# Patient Record
Sex: MALE | Race: BLACK OR AFRICAN AMERICAN | NOT HISPANIC OR LATINO | Employment: FULL TIME | URBAN - METROPOLITAN AREA
[De-identification: names, ages, dates, MRNs, and addresses within clinical notes are randomized per-mention and may not be internally consistent; named-entity substitution may affect disease eponyms.]

---

## 2023-01-01 ENCOUNTER — CLINICAL SUPPORT (OUTPATIENT)
Dept: PRIMARY CARE | Facility: CLINIC | Age: 63
End: 2023-01-01
Payer: COMMERCIAL

## 2023-01-01 ENCOUNTER — OFFICE VISIT (OUTPATIENT)
Dept: PRIMARY CARE | Facility: CLINIC | Age: 63
End: 2023-01-01
Payer: COMMERCIAL

## 2023-01-01 VITALS
WEIGHT: 264 LBS | SYSTOLIC BLOOD PRESSURE: 112 MMHG | BODY MASS INDEX: 36.96 KG/M2 | DIASTOLIC BLOOD PRESSURE: 70 MMHG | RESPIRATION RATE: 16 BRPM | HEIGHT: 71 IN | HEART RATE: 85 BPM | OXYGEN SATURATION: 98 % | TEMPERATURE: 97.7 F

## 2023-01-01 DIAGNOSIS — E66.01 MORBID (SEVERE) OBESITY DUE TO EXCESS CALORIES (MULTI): ICD-10-CM

## 2023-01-01 DIAGNOSIS — E78.2 MIXED HYPERLIPIDEMIA: ICD-10-CM

## 2023-01-01 DIAGNOSIS — I10 ESSENTIAL HYPERTENSION: ICD-10-CM

## 2023-01-01 DIAGNOSIS — E11.65 TYPE 2 DIABETES MELLITUS WITH HYPERGLYCEMIA, WITHOUT LONG-TERM CURRENT USE OF INSULIN (MULTI): Primary | ICD-10-CM

## 2023-01-01 DIAGNOSIS — Z23 NEED FOR INFLUENZA VACCINATION: ICD-10-CM

## 2023-01-01 LAB
ALANINE AMINOTRANSFERASE (SGPT) (U/L) IN SER/PLAS: 19 U/L (ref 10–52)
ALBUMIN (G/DL) IN SER/PLAS: 4.5 G/DL (ref 3.4–5)
ALBUMIN (MG/L) IN URINE: <7 MG/L
ALBUMIN/CREATININE (UG/MG) IN URINE: NORMAL UG/MG CRT (ref 0–30)
ALKALINE PHOSPHATASE (U/L) IN SER/PLAS: 40 U/L (ref 33–136)
ANION GAP IN SER/PLAS: 15 MMOL/L (ref 10–20)
APPEARANCE, URINE: CLEAR
ASPARTATE AMINOTRANSFERASE (SGOT) (U/L) IN SER/PLAS: 21 U/L (ref 9–39)
BASOPHILS (10*3/UL) IN BLOOD BY AUTOMATED COUNT: 0.06 X10E9/L (ref 0–0.1)
BASOPHILS/100 LEUKOCYTES IN BLOOD BY AUTOMATED COUNT: 0.7 % (ref 0–2)
BILIRUBIN TOTAL (MG/DL) IN SER/PLAS: 0.6 MG/DL (ref 0–1.2)
BILIRUBIN, URINE: NEGATIVE
BLOOD, URINE: NEGATIVE
CALCIUM (MG/DL) IN SER/PLAS: 9.9 MG/DL (ref 8.6–10.3)
CARBON DIOXIDE, TOTAL (MMOL/L) IN SER/PLAS: 25 MMOL/L (ref 21–32)
CHLORIDE (MMOL/L) IN SER/PLAS: 106 MMOL/L (ref 98–107)
CHOLESTEROL (MG/DL) IN SER/PLAS: 141 MG/DL (ref 0–199)
CHOLESTEROL IN HDL (MG/DL) IN SER/PLAS: 41.2 MG/DL
CHOLESTEROL/HDL RATIO: 3.4
COLOR, URINE: YELLOW
CREATININE (MG/DL) IN SER/PLAS: 1.2 MG/DL (ref 0.5–1.3)
CREATININE (MG/DL) IN URINE: 141 MG/DL (ref 20–370)
EOSINOPHILS (10*3/UL) IN BLOOD BY AUTOMATED COUNT: 0.16 X10E9/L (ref 0–0.7)
EOSINOPHILS/100 LEUKOCYTES IN BLOOD BY AUTOMATED COUNT: 1.8 % (ref 0–6)
ERYTHROCYTE DISTRIBUTION WIDTH (RATIO) BY AUTOMATED COUNT: 12.3 % (ref 11.5–14.5)
ERYTHROCYTE MEAN CORPUSCULAR HEMOGLOBIN CONCENTRATION (G/DL) BY AUTOMATED: 33.7 G/DL (ref 32–36)
ERYTHROCYTE MEAN CORPUSCULAR VOLUME (FL) BY AUTOMATED COUNT: 88 FL (ref 80–100)
ERYTHROCYTES (10*6/UL) IN BLOOD BY AUTOMATED COUNT: 4.87 X10E12/L (ref 4.5–5.9)
ESTIMATED AVERAGE GLUCOSE FOR HBA1C: 169 MG/DL
GFR MALE: 69 ML/MIN/1.73M2
GLUCOSE (MG/DL) IN SER/PLAS: 126 MG/DL (ref 74–99)
GLUCOSE, URINE: NEGATIVE MG/DL
HEMATOCRIT (%) IN BLOOD BY AUTOMATED COUNT: 43 % (ref 41–52)
HEMOGLOBIN (G/DL) IN BLOOD: 14.5 G/DL (ref 13.5–17.5)
HEMOGLOBIN A1C/HEMOGLOBIN TOTAL IN BLOOD: 7.5 %
IMMATURE GRANULOCYTES/100 LEUKOCYTES IN BLOOD BY AUTOMATED COUNT: 0.2 % (ref 0–0.9)
KETONES, URINE: NEGATIVE MG/DL
LDL: 60 MG/DL (ref 0–99)
LEUKOCYTE ESTERASE, URINE: NEGATIVE
LEUKOCYTES (10*3/UL) IN BLOOD BY AUTOMATED COUNT: 8.7 X10E9/L (ref 4.4–11.3)
LYMPHOCYTES (10*3/UL) IN BLOOD BY AUTOMATED COUNT: 2.2 X10E9/L (ref 1.2–4.8)
LYMPHOCYTES/100 LEUKOCYTES IN BLOOD BY AUTOMATED COUNT: 25.4 % (ref 13–44)
MONOCYTES (10*3/UL) IN BLOOD BY AUTOMATED COUNT: 0.81 X10E9/L (ref 0.1–1)
MONOCYTES/100 LEUKOCYTES IN BLOOD BY AUTOMATED COUNT: 9.4 % (ref 2–10)
NEUTROPHILS (10*3/UL) IN BLOOD BY AUTOMATED COUNT: 5.4 X10E9/L (ref 1.2–7.7)
NEUTROPHILS/100 LEUKOCYTES IN BLOOD BY AUTOMATED COUNT: 62.5 % (ref 40–80)
NITRITE, URINE: NEGATIVE
PH, URINE: 5 (ref 5–8)
PLATELETS (10*3/UL) IN BLOOD AUTOMATED COUNT: 325 X10E9/L (ref 150–450)
POC FINGERSTICK BLOOD GLUCOSE: 99 MG/DL (ref 70–100)
POC HEMOGLOBIN A1C: 6.7 % (ref 4.2–6.5)
POTASSIUM (MMOL/L) IN SER/PLAS: 4.1 MMOL/L (ref 3.5–5.3)
PROSTATE SPECIFIC AG (NG/ML) IN SER/PLAS: 27.38 NG/ML (ref 0–4)
PROTEIN TOTAL: 7.1 G/DL (ref 6.4–8.2)
PROTEIN, URINE: NEGATIVE MG/DL
SODIUM (MMOL/L) IN SER/PLAS: 142 MMOL/L (ref 136–145)
SPECIFIC GRAVITY, URINE: 1.02 (ref 1–1.03)
THYROTROPIN (MIU/L) IN SER/PLAS BY DETECTION LIMIT <= 0.05 MIU/L: 0.76 MIU/L (ref 0.44–3.98)
TRIGLYCERIDE (MG/DL) IN SER/PLAS: 197 MG/DL (ref 0–149)
UREA NITROGEN (MG/DL) IN SER/PLAS: 16 MG/DL (ref 6–23)
UROBILINOGEN, URINE: <2 MG/DL (ref 0–1.9)
VLDL: 39 MG/DL (ref 0–40)

## 2023-01-01 PROCEDURE — 3074F SYST BP LT 130 MM HG: CPT | Performed by: INTERNAL MEDICINE

## 2023-01-01 PROCEDURE — 90686 IIV4 VACC NO PRSV 0.5 ML IM: CPT | Performed by: INTERNAL MEDICINE

## 2023-01-01 PROCEDURE — 82962 GLUCOSE BLOOD TEST: CPT | Performed by: INTERNAL MEDICINE

## 2023-01-01 PROCEDURE — 3078F DIAST BP <80 MM HG: CPT | Performed by: INTERNAL MEDICINE

## 2023-01-01 PROCEDURE — 3051F HG A1C>EQUAL 7.0%<8.0%: CPT | Performed by: INTERNAL MEDICINE

## 2023-01-01 PROCEDURE — 83036 HEMOGLOBIN GLYCOSYLATED A1C: CPT | Performed by: INTERNAL MEDICINE

## 2023-01-01 PROCEDURE — 99213 OFFICE O/P EST LOW 20 MIN: CPT | Performed by: INTERNAL MEDICINE

## 2023-01-01 PROCEDURE — 3008F BODY MASS INDEX DOCD: CPT | Performed by: INTERNAL MEDICINE

## 2023-01-01 PROCEDURE — 90471 IMMUNIZATION ADMIN: CPT | Performed by: INTERNAL MEDICINE

## 2023-01-01 RX ORDER — LISINOPRIL 10 MG/1
10 TABLET ORAL DAILY
COMMUNITY
End: 2023-01-01 | Stop reason: WASHOUT

## 2023-01-01 RX ORDER — ASPIRIN 81 MG/1
81 TABLET ORAL DAILY
COMMUNITY

## 2023-01-01 RX ORDER — METFORMIN HYDROCHLORIDE 1000 MG/1
1000 TABLET ORAL 2 TIMES DAILY
COMMUNITY
End: 2023-01-01 | Stop reason: SDUPTHER

## 2023-01-01 RX ORDER — AMLODIPINE BESYLATE 5 MG/1
TABLET ORAL
COMMUNITY
End: 2023-01-01 | Stop reason: SDUPTHER

## 2023-01-01 RX ORDER — PRAVASTATIN SODIUM 80 MG/1
80 TABLET ORAL DAILY
COMMUNITY
End: 2023-01-01 | Stop reason: SDUPTHER

## 2023-01-01 RX ORDER — LISINOPRIL AND HYDROCHLOROTHIAZIDE 20; 25 MG/1; MG/1
1 TABLET ORAL DAILY
Qty: 100 TABLET | Refills: 1 | Status: SHIPPED | OUTPATIENT
Start: 2023-01-01 | End: 2024-05-02

## 2023-01-01 RX ORDER — METFORMIN HYDROCHLORIDE 1000 MG/1
1000 TABLET ORAL 2 TIMES DAILY
Qty: 200 TABLET | Refills: 1 | Status: SHIPPED | OUTPATIENT
Start: 2023-01-01 | End: 2024-05-02

## 2023-01-01 RX ORDER — BLOOD-GLUCOSE METER
EACH MISCELLANEOUS 2 TIMES DAILY
COMMUNITY
Start: 2021-12-29 | End: 2023-01-01 | Stop reason: SDUPTHER

## 2023-01-01 RX ORDER — DEXTROSE 4 G
TABLET,CHEWABLE ORAL
Refills: 0 | Status: CANCELLED | OUTPATIENT
Start: 2023-01-01

## 2023-01-01 RX ORDER — AMLODIPINE BESYLATE 5 MG/1
5 TABLET ORAL DAILY
Qty: 100 TABLET | Refills: 1 | Status: SHIPPED | OUTPATIENT
Start: 2023-01-01 | End: 2024-05-02

## 2023-01-01 RX ORDER — LISINOPRIL AND HYDROCHLOROTHIAZIDE 20; 25 MG/1; MG/1
1 TABLET ORAL DAILY
COMMUNITY
End: 2023-01-01 | Stop reason: SDUPTHER

## 2023-01-01 RX ORDER — PRAVASTATIN SODIUM 80 MG/1
80 TABLET ORAL DAILY
Qty: 100 TABLET | Refills: 1 | Status: SHIPPED | OUTPATIENT
Start: 2023-01-01 | End: 2024-05-02

## 2023-01-01 RX ORDER — GLIMEPIRIDE 4 MG/1
4 TABLET ORAL 2 TIMES DAILY
Qty: 200 TABLET | Refills: 1 | Status: SHIPPED | OUTPATIENT
Start: 2023-01-01 | End: 2024-05-02

## 2023-01-01 RX ORDER — DULAGLUTIDE 1.5 MG/.5ML
1.5 INJECTION, SOLUTION SUBCUTANEOUS
Qty: 8 ML | Refills: 1 | Status: SHIPPED | OUTPATIENT
Start: 2023-01-01 | End: 2024-01-01 | Stop reason: DRUGHIGH

## 2023-01-01 RX ORDER — DULAGLUTIDE 1.5 MG/.5ML
1.5 INJECTION, SOLUTION SUBCUTANEOUS
COMMUNITY
Start: 2022-01-12 | End: 2023-01-01 | Stop reason: SDUPTHER

## 2023-01-01 RX ORDER — BLOOD-GLUCOSE METER
1 EACH MISCELLANEOUS 2 TIMES DAILY
Qty: 100 STRIP | Refills: 1 | Status: SHIPPED | OUTPATIENT
Start: 2023-01-01 | End: 2024-05-02

## 2023-01-01 RX ORDER — GLIMEPIRIDE 4 MG/1
4 TABLET ORAL 2 TIMES DAILY
COMMUNITY
End: 2023-01-01 | Stop reason: SDUPTHER

## 2023-01-01 ASSESSMENT — ENCOUNTER SYMPTOMS
HEMATOLOGIC/LYMPHATIC NEGATIVE: 1
ALLERGIC/IMMUNOLOGIC NEGATIVE: 1
MUSCULOSKELETAL NEGATIVE: 1
EYES NEGATIVE: 1
CONSTITUTIONAL NEGATIVE: 1
CARDIOVASCULAR NEGATIVE: 1
PSYCHIATRIC NEGATIVE: 1
RESPIRATORY NEGATIVE: 1
ENDOCRINE NEGATIVE: 1
NEUROLOGICAL NEGATIVE: 1
GASTROINTESTINAL NEGATIVE: 1

## 2023-10-31 PROBLEM — R97.20 ELEVATED PSA: Status: ACTIVE | Noted: 2023-01-01

## 2023-10-31 PROBLEM — C61 MALIGNANT TUMOR OF PROSTATE (MULTI): Status: ACTIVE | Noted: 2020-06-22

## 2023-10-31 PROBLEM — E11.9 TYPE 2 DIABETES MELLITUS (MULTI): Status: ACTIVE | Noted: 2019-12-02

## 2023-10-31 PROBLEM — I10 ESSENTIAL HYPERTENSION: Status: ACTIVE | Noted: 2019-12-02

## 2023-10-31 PROBLEM — E78.2 MIXED HYPERLIPIDEMIA: Status: ACTIVE | Noted: 2019-12-02

## 2023-12-13 PROBLEM — E66.01 MORBID (SEVERE) OBESITY DUE TO EXCESS CALORIES (MULTI): Status: ACTIVE | Noted: 2023-01-01

## 2023-12-13 PROBLEM — E11.65 TYPE 2 DIABETES MELLITUS WITH HYPERGLYCEMIA, WITHOUT LONG-TERM CURRENT USE OF INSULIN (MULTI): Status: ACTIVE | Noted: 2017-05-24

## 2023-12-13 NOTE — PROGRESS NOTES
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  A little higher than last check.  No low bs.    Diet has not changed.    States that he has been taking his medication daily.   Htn--on lisinopril/hydrochlorothiazide--bp is well controlled.  High chol.  Taking and tolerating pravastatin.    Past Medical History:  Past Medical History:   Diagnosis Date    Other specified abnormal findings of blood chemistry     High serum cholestanol    Personal history of other diseases of the circulatory system     History of high blood pressure    Personal history of other endocrine, nutritional and metabolic disease     History of diabetes mellitus    Personal history of other specified conditions     History of elevated prostate specific antigen (PSA)        Social History:        Family History:  family history includes Hodgkin's lymphoma in his mother.      Allergies:  Allergies   Allergen Reactions    Penicillins Unknown    Sitagliptin Phosphate Unknown     nausea/vomiting       Outpatient Medications:  Current Outpatient Medications   Medication Instructions    amLODIPine (Norvasc) 5 mg tablet oral    aspirin 81 mg, oral, Daily    glimepiride (AMARYL) 4 mg, oral, 2 times daily    lisinopriL-hydrochlorothiazide 20-25 mg tablet 1 tablet, oral, Daily    lisinopril 10 mg, oral, Daily    metFORMIN (GLUCOPHAGE) 1,000 mg, oral, 2 times daily    OneTouch Verio test strips strip miscellaneous, 2 times daily    pravastatin (PRAVACHOL) 80 mg, oral, Daily    Trulicity 1.5 mg, subcutaneous, Weekly         ROS:   Review of Systems   Constitutional: Negative.    HENT: Negative.     Eyes: Negative.    Respiratory: Negative.     Cardiovascular: Negative.    Gastrointestinal: Negative.    Endocrine:  "Negative.    Genitourinary: Negative.    Musculoskeletal: Negative.    Skin: Negative.    Allergic/Immunologic: Negative.    Neurological: Negative.    Hematological: Negative.    Psychiatric/Behavioral: Negative.          Vitals:  /70 (BP Location: Right arm, Patient Position: Sitting, BP Cuff Size: Adult)   Pulse 85   Temp 36.5 °C (97.7 °F) (Temporal)   Resp 16   Ht 1.791 m (5' 10.5\")   Wt 120 kg (264 lb)   SpO2 98%   BMI 37.34 kg/m²   Wt Readings from Last 2 Encounters:   12/13/23 120 kg (264 lb)   02/26/20 121 kg (265 lb 10.5 oz)       Physical Exam:  Physical Exam  Constitutional:       Appearance: Normal appearance.   HENT:      Head: Normocephalic and atraumatic.      Right Ear: Tympanic membrane and ear canal normal.      Left Ear: Tympanic membrane and ear canal normal.      Nose: Nose normal.      Mouth/Throat:      Mouth: Mucous membranes are moist.      Pharynx: Oropharynx is clear.   Eyes:      Conjunctiva/sclera: Conjunctivae normal.      Pupils: Pupils are equal, round, and reactive to light.   Cardiovascular:      Rate and Rhythm: Normal rate and regular rhythm.      Heart sounds: Normal heart sounds, S1 normal and S2 normal. No murmur heard.  Pulmonary:      Effort: Pulmonary effort is normal.      Breath sounds: Normal breath sounds.   Abdominal:      General: Bowel sounds are normal.      Palpations: Abdomen is soft.      Tenderness: There is no abdominal tenderness. There is no guarding or rebound.   Musculoskeletal:         General: Normal range of motion.      Cervical back: Neck supple.   Skin:     General: Skin is warm and dry.   Neurological:      Mental Status: He is alert and oriented to person, place, and time.               Assessment/Plan   Diagnoses and all orders for this visit:  Type 2 diabetes mellitus with hyperglycemia, without long-term current use of insulin (CMS/[facility])  -     POCT glycosylated hemoglobin (Hb A1C) manually resulted  -     POCT fingerstick glucose " manually resulted  -     OneTouch Verio test strips strip; 1 strip 2 times a day.  -     dulaglutide (Trulicity) 1.5 mg/0.5 mL pen injector injection; Inject 1.5 mg under the skin 1 (one) time per week.  -     glimepiride (Amaryl) 4 mg tablet; Take 1 tablet (4 mg) by mouth 2 times a day.  -     metFORMIN (Glucophage) 1,000 mg tablet; Take 1 tablet (1,000 mg) by mouth 2 times a day.  Mixed hyperlipidemia  -     pravastatin (Pravachol) 80 mg tablet; Take 1 tablet (80 mg) by mouth once daily.  Essential hypertension  -     amLODIPine (Norvasc) 5 mg tablet; Take 1 tablet (5 mg) by mouth once daily.  -     lisinopriL-hydrochlorothiazide 20-25 mg tablet; Take 1 tablet by mouth once daily.  Morbid (severe) obesity due to excess calories (CMS/[facility])  BMI 37.0-37.9, adult        Orders:  No orders of the defined types were placed in this encounter.       Followup Appts:  No future appointments.        ____________________________________________________________  Raymond Greene MD

## 2024-01-01 ENCOUNTER — TELEPHONE (OUTPATIENT)
Dept: PRIMARY CARE | Facility: CLINIC | Age: 64
End: 2024-01-01
Payer: COMMERCIAL

## 2024-01-01 ENCOUNTER — OFFICE VISIT (OUTPATIENT)
Dept: PRIMARY CARE | Facility: CLINIC | Age: 64
End: 2024-01-01
Payer: COMMERCIAL

## 2024-01-01 VITALS
WEIGHT: 269 LBS | HEIGHT: 71 IN | DIASTOLIC BLOOD PRESSURE: 91 MMHG | HEART RATE: 97 BPM | BODY MASS INDEX: 37.66 KG/M2 | OXYGEN SATURATION: 99 % | SYSTOLIC BLOOD PRESSURE: 133 MMHG

## 2024-01-01 DIAGNOSIS — E11.65 TYPE 2 DIABETES MELLITUS WITH HYPERGLYCEMIA, WITHOUT LONG-TERM CURRENT USE OF INSULIN (MULTI): ICD-10-CM

## 2024-01-01 DIAGNOSIS — E11.65 TYPE 2 DIABETES MELLITUS WITH HYPERGLYCEMIA, WITHOUT LONG-TERM CURRENT USE OF INSULIN (MULTI): Primary | ICD-10-CM

## 2024-01-01 LAB
POC FINGERSTICK BLOOD GLUCOSE: 103 MG/DL (ref 70–100)
POC HEMOGLOBIN A1C: 7.4 % (ref 4.2–6.5)

## 2024-01-01 PROCEDURE — 99213 OFFICE O/P EST LOW 20 MIN: CPT | Performed by: INTERNAL MEDICINE

## 2024-01-01 PROCEDURE — 3075F SYST BP GE 130 - 139MM HG: CPT | Performed by: INTERNAL MEDICINE

## 2024-01-01 PROCEDURE — 3008F BODY MASS INDEX DOCD: CPT | Performed by: INTERNAL MEDICINE

## 2024-01-01 PROCEDURE — 83036 HEMOGLOBIN GLYCOSYLATED A1C: CPT | Performed by: INTERNAL MEDICINE

## 2024-01-01 PROCEDURE — 82962 GLUCOSE BLOOD TEST: CPT | Performed by: INTERNAL MEDICINE

## 2024-01-01 PROCEDURE — 3080F DIAST BP >= 90 MM HG: CPT | Performed by: INTERNAL MEDICINE

## 2024-01-01 RX ORDER — DULAGLUTIDE 3 MG/.5ML
3 INJECTION, SOLUTION SUBCUTANEOUS
Refills: 1 | Status: CANCELLED | OUTPATIENT
Start: 2024-01-01

## 2024-01-01 RX ORDER — DULAGLUTIDE 0.75 MG/.5ML
0.75 INJECTION, SOLUTION SUBCUTANEOUS
Qty: 6 ML | Refills: 0 | Status: SHIPPED | OUTPATIENT
Start: 2024-01-01 | End: 2024-01-01 | Stop reason: DRUGHIGH

## 2024-01-01 RX ORDER — DULAGLUTIDE 1.5 MG/.5ML
INJECTION, SOLUTION SUBCUTANEOUS
Refills: 1 | Status: CANCELLED | OUTPATIENT
Start: 2024-01-01

## 2024-01-01 RX ORDER — DULAGLUTIDE 0.75 MG/.5ML
0.75 INJECTION, SOLUTION SUBCUTANEOUS
Qty: 6 ML | Refills: 0 | Status: CANCELLED | OUTPATIENT
Start: 2024-05-05

## 2024-01-01 ASSESSMENT — ENCOUNTER SYMPTOMS
CARDIOVASCULAR NEGATIVE: 1
EYES NEGATIVE: 1
HEMATOLOGIC/LYMPHATIC NEGATIVE: 1
ALLERGIC/IMMUNOLOGIC NEGATIVE: 1
NEUROLOGICAL NEGATIVE: 1
MUSCULOSKELETAL NEGATIVE: 1
GASTROINTESTINAL NEGATIVE: 1
RESPIRATORY NEGATIVE: 1
PSYCHIATRIC NEGATIVE: 1
ENDOCRINE NEGATIVE: 1
CONSTITUTIONAL NEGATIVE: 1

## 2024-02-12 NOTE — TELEPHONE ENCOUNTER
Patient is calling in asking about this medication again and also wants to let you know that his insurance will only pay for a 3 month supply each prescription.

## 2024-02-15 NOTE — TELEPHONE ENCOUNTER
Spoke to pt regarding Trulicity.  He has 1 pen left for next wed. 2/21/24    I called [facility]. They had to check a few things, but said yes, they did receive the Rx 12/2023. However, they have had it on back order for a few months and not sure when they would get the Trulicity 1.5mg again.   I asked if it was backorder for [facility] or [facility]. She stated she knew for sure it was [facility]. They do have .75mg or 3mg in stock if you wanted to change dose.     I called PT again and informed him those details and advised him to call some other pharmacies. He said he wouldn't know how to ask since you sent it in. I informed to ask for Trulicity 1.5mg pen. He argued stating that he still wouldn't know, and then to have to change around the copay savings card. I told him I didn't know what else to do then if he wouldn't call other pharmacies.   He said to just let you know he has 1 pen left for next week.

## 2024-03-04 NOTE — TELEPHONE ENCOUNTER
[facility] sent fax due to Trulicity Backorder/unavailable -asking for substitute to be sent or try another pharmacy

## 2024-04-17 NOTE — PROGRESS NOTES
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  Has had trouble finding trulicity   He did not have his record today, but says bs have been higher.    Last hgba1c was up to 7.5.  will check today.  Dwp if still elevated will incr to 3mg weekly.      Past Medical History:  Past Medical History:   Diagnosis Date    Other specified abnormal findings of blood chemistry     High serum cholestanol    Personal history of other diseases of the circulatory system     History of high blood pressure    Personal history of other endocrine, nutritional and metabolic disease     History of diabetes mellitus    Personal history of other specified conditions     History of elevated prostate specific antigen (PSA)        Social History:        Family History:  family history includes Hodgkin's lymphoma in his mother.      Allergies:  Allergies   Allergen Reactions    Penicillins Unknown    Sitagliptin Phosphate Unknown     nausea/vomiting       Outpatient Medications:  Current Outpatient Medications   Medication Instructions    amLODIPine (NORVASC) 5 mg, oral, Daily    aspirin 81 mg, oral, Daily    glimepiride (AMARYL) 4 mg, oral, 2 times daily    lisinopriL-hydrochlorothiazide 20-25 mg tablet 1 tablet, oral, Daily    metFORMIN (GLUCOPHAGE) 1,000 mg, oral, 2 times daily    OneTouch Verio test strips strip 1 strip, miscellaneous, 2 times daily    pravastatin (PRAVACHOL) 80 mg, oral, Daily         ROS:   Review of Systems   Constitutional: Negative.    HENT: Negative.     Eyes: Negative.    Respiratory: Negative.     Cardiovascular: Negative.    Gastrointestinal: Negative.    Endocrine: Negative.    Genitourinary: Negative.    Musculoskeletal: Negative.    Skin: Negative.    Allergic/Immunologic: Negative.    Neurological: Negative.   "  Hematological: Negative.    Psychiatric/Behavioral: Negative.          Vitals:  BP (!) 133/91 (BP Location: Right arm, Patient Position: Sitting, BP Cuff Size: Adult)   Pulse 97   Ht 1.791 m (5' 10.5\")   Wt 122 kg (269 lb)   SpO2 99%   BMI 38.05 kg/m²   Wt Readings from Last 2 Encounters:   04/17/24 122 kg (269 lb)   12/13/23 120 kg (264 lb)       Physical Exam:  Physical Exam  Constitutional:       Appearance: Normal appearance.   HENT:      Head: Normocephalic and atraumatic.   Cardiovascular:      Rate and Rhythm: Normal rate and regular rhythm.      Heart sounds: Normal heart sounds. No murmur heard.  Pulmonary:      Effort: Pulmonary effort is normal.      Breath sounds: Normal breath sounds. No wheezing, rhonchi or rales.   Neurological:      Mental Status: He is alert and oriented to person, place, and time.      Sensory: Sensation is intact.      Motor: Motor function is intact.               Assessment/Plan   Diagnoses and all orders for this visit:  Type 2 diabetes mellitus with hyperglycemia, without long-term current use of insulin ([facility])  Comments:  hgba1c 7.4.  Orders:  -     POCT fingerstick glucose manually resulted  -     POCT glycosylated hemoglobin (Hb A1C) manually resulted        Orders:  Orders Placed This Encounter   Procedures    POCT fingerstick glucose manually resulted    POCT glycosylated hemoglobin (Hb A1C) manually resulted        Followup Appts:  Future Appointments   Date Time Provider Department Center   8/21/2024  2:00 PM Raymond Greene MD [id number]           ____________________________________________________________  Raymond Greene MD  "

## 2024-04-24 NOTE — TELEPHONE ENCOUNTER
Patient not able to find the original strength you are trying to change him to  Wants to go back on the trulicity .75mg since he can find it easier. Needs it for a 90 day supply per his insurance.

## 2024-04-25 NOTE — TELEPHONE ENCOUNTER
Patient called states that when he transferred his trulicity to another pharmacy it cancelled his script at [facility]. He was only able to get one box. He needs a new script for 2 months sent to [facility] and put on a waiting list there to get medication.

## 2024-05-02 ENCOUNTER — TELEPHONE (OUTPATIENT)
Dept: PRIMARY CARE | Facility: CLINIC | Age: 64
End: 2024-05-02
Payer: COMMERCIAL

## 2024-05-02 NOTE — TELEPHONE ENCOUNTER
ACCT: 0005 PAGE: 002 MS   IN: [facility]  u  12:03PM EDT  DOCTOR:    DR. GREENE   CALLER:    EMA NURSE(FAMILY MEMBER)    PHONE#:    [ [phone] ]   :       64   RE:        PT/NURSE,EFFIE  HAS CONCERNS, PLEASE CALL     Called patients daughter, she wanted to inform us of her fathers passing.  Also, had questions about stating it was due to natural causes and wanted to know what we thought. I told her I could not give her any info but I would ask Dr. Greene to call her.  Also, asking what she would do to obtain her Dad's medical records. I informed her I would call her back with the procedure as I just sent an email asking for another patient.

## 2024-05-13 ENCOUNTER — TELEPHONE (OUTPATIENT)
Dept: PRIMARY CARE | Facility: CLINIC | Age: 64
End: 2024-05-13
Payer: COMMERCIAL

## 2024-05-22 DIAGNOSIS — E11.65 TYPE 2 DIABETES MELLITUS WITH HYPERGLYCEMIA, WITHOUT LONG-TERM CURRENT USE OF INSULIN (MULTI): ICD-10-CM

## 2024-05-22 RX ORDER — METFORMIN HYDROCHLORIDE 1000 MG/1
1000 TABLET ORAL 2 TIMES DAILY
Qty: 200 TABLET | Refills: 1 | OUTPATIENT
Start: 2024-05-22

## 2024-05-25 DIAGNOSIS — E78.2 MIXED HYPERLIPIDEMIA: ICD-10-CM

## 2024-05-25 DIAGNOSIS — I10 ESSENTIAL HYPERTENSION: ICD-10-CM

## 2024-05-25 RX ORDER — PRAVASTATIN SODIUM 80 MG/1
80 TABLET ORAL DAILY
Qty: 90 TABLET | Refills: 2 | OUTPATIENT
Start: 2024-05-25

## 2024-05-25 RX ORDER — LISINOPRIL AND HYDROCHLOROTHIAZIDE 20; 25 MG/1; MG/1
1 TABLET ORAL DAILY
Qty: 90 TABLET | Refills: 2 | OUTPATIENT
Start: 2024-05-25

## 2024-08-21 ENCOUNTER — APPOINTMENT (OUTPATIENT)
Dept: PRIMARY CARE | Facility: CLINIC | Age: 64
End: 2024-08-21
Payer: COMMERCIAL